# Patient Record
Sex: MALE | Race: BLACK OR AFRICAN AMERICAN
[De-identification: names, ages, dates, MRNs, and addresses within clinical notes are randomized per-mention and may not be internally consistent; named-entity substitution may affect disease eponyms.]

---

## 2018-03-08 ENCOUNTER — HOSPITAL ENCOUNTER (EMERGENCY)
Dept: HOSPITAL 72 - EMR | Age: 63
Discharge: ANOTHER HOSPITAL | End: 2018-03-08
Payer: COMMERCIAL

## 2018-03-08 VITALS — DIASTOLIC BLOOD PRESSURE: 108 MMHG | SYSTOLIC BLOOD PRESSURE: 169 MMHG

## 2018-03-08 VITALS — DIASTOLIC BLOOD PRESSURE: 129 MMHG | SYSTOLIC BLOOD PRESSURE: 152 MMHG

## 2018-03-08 VITALS — HEIGHT: 73 IN | WEIGHT: 185 LBS | BODY MASS INDEX: 24.52 KG/M2

## 2018-03-08 DIAGNOSIS — I24.9: Primary | ICD-10-CM

## 2018-03-08 DIAGNOSIS — I10: ICD-10-CM

## 2018-03-08 LAB
ADD MANUAL DIFF: NO
ALBUMIN SERPL-MCNC: 3.4 G/DL (ref 3.4–5)
ALBUMIN/GLOB SERPL: 0.9 {RATIO} (ref 1–2.7)
ALP SERPL-CCNC: 48 U/L (ref 46–116)
ALT SERPL-CCNC: 50 U/L (ref 12–78)
ANION GAP SERPL CALC-SCNC: 4 MMOL/L (ref 5–15)
APPEARANCE UR: CLEAR
APTT PPP: (no result) S
AST SERPL-CCNC: 24 U/L (ref 15–37)
BASOPHILS NFR BLD AUTO: 1.2 % (ref 0–2)
BILIRUB SERPL-MCNC: 0.3 MG/DL (ref 0.2–1)
BUN SERPL-MCNC: 13 MG/DL (ref 7–18)
CALCIUM SERPL-MCNC: 9 MG/DL (ref 8.5–10.1)
CHLORIDE SERPL-SCNC: 106 MMOL/L (ref 98–107)
CO2 SERPL-SCNC: 30 MMOL/L (ref 21–32)
CREAT SERPL-MCNC: 1.5 MG/DL (ref 0.55–1.3)
EOSINOPHIL NFR BLD AUTO: 4.3 % (ref 0–3)
ERYTHROCYTE [DISTWIDTH] IN BLOOD BY AUTOMATED COUNT: 10.6 % (ref 11.6–14.8)
GLOBULIN SER-MCNC: 4 G/DL
GLUCOSE UR STRIP-MCNC: NEGATIVE MG/DL
HCT VFR BLD CALC: 42.8 % (ref 42–52)
HGB BLD-MCNC: 15.2 G/DL (ref 14.2–18)
KETONES UR QL STRIP: NEGATIVE
LEUKOCYTE ESTERASE UR QL STRIP: NEGATIVE
LYMPHOCYTES NFR BLD AUTO: 17.1 % (ref 20–45)
MCV RBC AUTO: 94 FL (ref 80–99)
MONOCYTES NFR BLD AUTO: 6.3 % (ref 1–10)
NEUTROPHILS NFR BLD AUTO: 71.1 % (ref 45–75)
NITRITE UR QL STRIP: NEGATIVE
PH UR STRIP: 7 [PH] (ref 4.5–8)
PLATELET # BLD: 280 K/UL (ref 150–450)
POTASSIUM SERPL-SCNC: 3.8 MMOL/L (ref 3.5–5.1)
PROT UR QL STRIP: NEGATIVE
RBC # BLD AUTO: 4.53 M/UL (ref 4.7–6.1)
SODIUM SERPL-SCNC: 140 MMOL/L (ref 136–145)
SP GR UR STRIP: 1.01 (ref 1–1.03)
UROBILINOGEN UR-MCNC: NORMAL MG/DL (ref 0–1)
WBC # BLD AUTO: 7.3 K/UL (ref 4.8–10.8)

## 2018-03-08 PROCEDURE — 36415 COLL VENOUS BLD VENIPUNCTURE: CPT

## 2018-03-08 PROCEDURE — 80307 DRUG TEST PRSMV CHEM ANLYZR: CPT

## 2018-03-08 PROCEDURE — 93005 ELECTROCARDIOGRAM TRACING: CPT

## 2018-03-08 PROCEDURE — 83880 ASSAY OF NATRIURETIC PEPTIDE: CPT

## 2018-03-08 PROCEDURE — 85025 COMPLETE CBC W/AUTO DIFF WBC: CPT

## 2018-03-08 PROCEDURE — 71045 X-RAY EXAM CHEST 1 VIEW: CPT

## 2018-03-08 PROCEDURE — 81003 URINALYSIS AUTO W/O SCOPE: CPT

## 2018-03-08 PROCEDURE — 99284 EMERGENCY DEPT VISIT MOD MDM: CPT

## 2018-03-08 PROCEDURE — 84484 ASSAY OF TROPONIN QUANT: CPT

## 2018-03-08 PROCEDURE — 80053 COMPREHEN METABOLIC PANEL: CPT

## 2018-03-08 RX ADMIN — NITROGLYCERIN PRN MG: 0.4 TABLET SUBLINGUAL at 09:58

## 2018-03-08 RX ADMIN — NITROGLYCERIN PRN MG: 0.4 TABLET SUBLINGUAL at 09:49

## 2018-03-08 NOTE — DIAGNOSTIC IMAGING REPORT
Indication: Chest pain

 

Comparison:  11/5/2014

 

A single view chest radiograph was obtained.

 

Findings:

 

Cardiomediastinal appearance is within normal limits for age. Aorta is ectatic.

Pulmonary vascularity is appropriate. The diaphragmatic contour is smooth and

costophrenic angles are sharp. No pleural effusions are identified. The bones are

slightly osteopenic.

 

Impression: No acute findings

## 2018-03-08 NOTE — EMERGENCY ROOM REPORT
History of Present Illness


General


Chief Complaint:  Chest Pain


Source:  Patient





Present Illness


HPI


62-year-old male with pmhx of HTN, occasional smoking history p/w chest pain 

for one hour. Chest pain started while at rest.  His friend took his blood 

pressure and noted it to be high systolic 180.  Chest pain is localized to 

substernal area, no radiation to back or other areas, sharp in nature, gradual 

in onset, pain has gradually improved since coming to the emergency room. no 

SOB. Denies palpitations, diaphoresis, n/v. 


Denies fever, chills, cough, abd pain. Denies trauma.


Patient states that he had a cardiac workup, including stress test and angiogram

, that was performed more than 6-7 years ago, which were normal


States that he smokes occasionally, never one pack of cigarettes per day.  

Denies other drug use


Allergies:  


Coded Allergies:  


     NO KNOWN DRUG ALLERGIES (Unverified  Allergy, Unknown, 11/6/14)





Patient History


Past Medical History:  see triage record


Past Surgical History:  none


Pertinent Family History:  none


Reviewed Nursing Documentation:  PMH: Agreed, PSxH: Agreed





Nursing Documentation-PMH


Past Medical History:  No History, Except For


Hx Hypertension:  Yes





Review of Systems


All Other Systems:  negative except mentioned in HPI





Physical Exam





Vital Signs








  Date Time  Temp Pulse Resp B/P (MAP) Pulse Ox O2 Delivery O2 Flow Rate FiO2


 


3/8/18 09:22 98.4 85 16 182/120 96 Room Air  





 98.4       








Sp02 EP Interpretation:  reviewed, normal


General Appearance:  normal inspection, well appearing, no apparent distress, 

alert, GCS 15, non-toxic


Head:  normocephalic, atraumatic


Eyes:  bilateral eye normal inspection, bilateral eye PERRL, bilateral eye EOMI


ENT:  normal ENT inspection, normal pharynx, normal voice, moist mucus membranes


Neck:  normal inspection, full range of motion, supple


Respiratory:  normal inspection, lungs clear, normal breath sounds, no 

respiratory distress, no retraction, no wheezing, speaking full sentences, 

chest symmetrical


Cardiovascular #1:  normal inspection, regular rate, rhythm, no edema, normal 

capillary refill


Cardiovascular #2:  2+ radial (R), 2+ radial (L)


Gastrointestinal:  normal inspection, non tender, soft, non-distended, no 

guarding


Genitourinary:  no CVA tenderness


Musculoskeletal:  normal inspection, back normal, normal range of motion, non-

tender


Neurologic:  normal inspection, alert, oriented x3, responsive, motor strength/

tone normal, sensory intact, normal gait, speech normal


Psychiatric:  normal inspection, judgement/insight normal, memory normal


Skin:  normal inspection, normal color, no rash, warm/dry, well hydrated, 

normal turgor





Medical Decision Making


Diagnostic Impression:  


 Primary Impression:  


 ACS (acute coronary syndrome)


ER Course


62-year-old male with pmhx of hypertension p/w CP


DDX: ACS vs. CHF vs. pneumonia vs. gastritis/GERD vs. pneumothorax 


PE on differential however at this time there are other more likely diagnoses. 





Plan:


IV access, obtain labs including troponin, EKG, CXR


ASA, pain control with nitro / morphine


Anticipate admission





ER course:


Patient was treated with ASA.


Patient has remained on a monitor, HD stable, chest pain improved.





Disposition:


Patient requires admission for chest pain due to new TWI on EKG and some 

persistent CP


Due to patient's history and comorbidities, patient has increased risk of acute 

cardiac event. Patient requires admission for further workup, serial troponin, 

and possible stress test





Pt will be xferred due to insurance purposes -- [facility] hospitalist Dr King who 

has accepted pt to xfer to [facility]





Please note that this Emergency Department Report was dictated using [employer] technology software, occasionally this can lead to 

erroneous entry secondary to interpretation by the dictation equipment.





EKG Diagnostic Results


EP Interpretation: Yes


Rate: normal


Rhythm: NSR


ST Segments: T-wave inversion lateral leads that are new since previous EKG 2014


ASA given to patient: Yes





Rhythm Strip


EP Interpretation: Yes


Rate: 70


Rhythm: NSR, no PVCs, no ectopy





Chest X-ray


CXR: Ordered: Yes


1 view


Indication: Chest pain


EP interpretation: Yes


Interpretation: No consolidation, no effusion, no PTX, no acute cardiopulmonary 

disease


Impression: No acute disease





Electronically signed by Willa Arguello MD





Laboratory Tests








Test


  3/8/18


09:30 3/8/18


10:36


 


White Blood Count


  7.3 K/UL


(4.8-10.8) 


 


 


Red Blood Count


  4.53 M/UL


(4.70-6.10)  L 


 


 


Hemoglobin


  15.2 G/DL


(14.2-18.0) 


 


 


Hematocrit


  42.8 %


(42.0-52.0) 


 


 


Mean Corpuscular Volume 94 FL (80-99)   


 


Mean Corpuscular Hemoglobin


  33.5 PG


(27.0-31.0)  H 


 


 


Mean Corpuscular Hemoglobin


Concent 35.4 G/DL


(32.0-36.0) 


 


 


Red Cell Distribution Width


  10.6 %


(11.6-14.8)  L 


 


 


Platelet Count


  280 K/UL


(150-450) 


 


 


Mean Platelet Volume


  6.0 FL


(6.5-10.1)  L 


 


 


Neutrophils (%) (Auto)


  71.1 %


(45.0-75.0) 


 


 


Lymphocytes (%) (Auto)


  17.1 %


(20.0-45.0)  L 


 


 


Monocytes (%) (Auto)


  6.3 %


(1.0-10.0) 


 


 


Eosinophils (%) (Auto)


  4.3 %


(0.0-3.0)  H 


 


 


Basophils (%) (Auto)


  1.2 %


(0.0-2.0) 


 


 


Sodium Level


  140 MMOL/L


(136-145) 


 


 


Potassium Level


  3.8 MMOL/L


(3.5-5.1) 


 


 


Chloride Level


  106 MMOL/L


() 


 


 


Carbon Dioxide Level


  30 MMOL/L


(21-32) 


 


 


Anion Gap


  4 mmol/L


(5-15)  L 


 


 


Blood Urea Nitrogen


  13 mg/dL


(7-18) 


 


 


Creatinine


  1.5 MG/DL


(0.55-1.30)  H 


 


 


Estimate Glomerular


Filtration Rate 57.7 mL/min


(>60) 


 


 


Glucose Level


  135 MG/DL


()  H 


 


 


Calcium Level


  9.0 MG/DL


(8.5-10.1) 


 


 


Total Bilirubin


  0.3 MG/DL


(0.2-1.0) 


 


 


Aspartate Amino Transferase


(AST) 24 U/L (15-37)


  


 


 


Alanine Aminotransferase (ALT)


  50 U/L (12-78)


  


 


 


Alkaline Phosphatase


  48 U/L


() 


 


 


Troponin I


  0.005 ng/mL


(0.000-0.056) 


 


 


Pro-B-Type Natriuretic Peptide


  117 pg/mL


(0-125) 


 


 


Total Protein


  7.4 G/DL


(6.4-8.2) 


 


 


Albumin


  3.4 G/DL


(3.4-5.0) 


 


 


Globulin 4.0 g/dL   


 


Albumin/Globulin Ratio


  0.9 (1.0-2.7)


L 


 


 


Urine Color  Pending  


 


Urine Appearance  Pending  


 


Urine pH  Pending  


 


Urine Specific Gravity  Pending  


 


Urine Protein  Pending  


 


Urine Glucose (UA)  Pending  


 


Urine Ketones  Pending  


 


Urine Occult Blood  Pending  


 


Urine Nitrite  Pending  


 


Urine Bilirubin  Pending  


 


Urine Urobilinogen  Pending  


 


Urine Leukocyte Esterase  Pending  


 


Urine Opiates Screen  Pending  


 


Urine Barbiturates Screen  Pending  


 


Phencyclidine (PCP) Screen  Pending  


 


Urine Amphetamines Screen  Pending  


 


Urine Benzodiazepines Screen  Pending  


 


Urine Cocaine Screen  Pending  


 


Urine Marijuana (THC) Screen  Pending  











Last Vital Signs








  Date Time  Temp Pulse Resp B/P (MAP) Pulse Ox O2 Delivery O2 Flow Rate FiO2


 


3/8/18 09:35 97.4 82 18 152/129 99 Room Air  





 97.4       








Disposition:  XFER SHT-TRM HOSP


Condition:  Serious


Referrals:  


[facility],REFERRING (PCP)











Willa Arguello M.D. Mar 8, 2018 09:44

## 2018-03-11 NOTE — CARDIOLOGY REPORT
--------------- APPROVED REPORT --------------





EKG Measurement

Heart [id number]

MI 

[id number]

[id number]

[id number]

[id number]





Normal sinus rhythm with sinus arrhythmia

Left axis deviation



Abnormal ECG

## 2020-07-21 ENCOUNTER — HOSPITAL ENCOUNTER (EMERGENCY)
Dept: HOSPITAL 72 - EMR | Age: 65
Discharge: ANOTHER HOSPITAL | End: 2020-07-21
Payer: COMMERCIAL

## 2020-07-21 VITALS — SYSTOLIC BLOOD PRESSURE: 152 MMHG | DIASTOLIC BLOOD PRESSURE: 85 MMHG

## 2020-07-21 VITALS — DIASTOLIC BLOOD PRESSURE: 101 MMHG | SYSTOLIC BLOOD PRESSURE: 157 MMHG

## 2020-07-21 VITALS — WEIGHT: 185 LBS | HEIGHT: 72 IN | BODY MASS INDEX: 25.06 KG/M2

## 2020-07-21 VITALS — DIASTOLIC BLOOD PRESSURE: 93 MMHG | SYSTOLIC BLOOD PRESSURE: 148 MMHG

## 2020-07-21 DIAGNOSIS — I10: ICD-10-CM

## 2020-07-21 DIAGNOSIS — R20.0: ICD-10-CM

## 2020-07-21 DIAGNOSIS — F14.10: ICD-10-CM

## 2020-07-21 DIAGNOSIS — G45.9: Primary | ICD-10-CM

## 2020-07-21 DIAGNOSIS — N17.9: ICD-10-CM

## 2020-07-21 DIAGNOSIS — F12.10: ICD-10-CM

## 2020-07-21 DIAGNOSIS — E78.5: ICD-10-CM

## 2020-07-21 DIAGNOSIS — R00.1: ICD-10-CM

## 2020-07-21 DIAGNOSIS — F17.200: ICD-10-CM

## 2020-07-21 DIAGNOSIS — F12.90: ICD-10-CM

## 2020-07-21 LAB
ADD MANUAL DIFF: NO
ALBUMIN SERPL-MCNC: 4.2 G/DL (ref 3.4–5)
ALBUMIN/GLOB SERPL: 1 {RATIO} (ref 1–2.7)
ALP SERPL-CCNC: 66 U/L (ref 46–116)
ALT SERPL-CCNC: 70 U/L (ref 12–78)
ANION GAP SERPL CALC-SCNC: 5 MMOL/L (ref 5–15)
AST SERPL-CCNC: 21 U/L (ref 15–37)
BASOPHILS NFR BLD AUTO: 1 % (ref 0–2)
BILIRUB SERPL-MCNC: 0.2 MG/DL (ref 0.2–1)
BUN SERPL-MCNC: 20 MG/DL (ref 7–18)
CALCIUM SERPL-MCNC: 10 MG/DL (ref 8.5–10.1)
CALCIUM SERPL-MCNC: 10.1 MG/DL (ref 8.5–10.1)
CHLORIDE SERPL-SCNC: 99 MMOL/L (ref 98–107)
CHOLEST SERPL-MCNC: 143 MG/DL (ref ?–200)
CO2 SERPL-SCNC: 34 MMOL/L (ref 21–32)
CREAT SERPL-MCNC: 1.5 MG/DL (ref 0.55–1.3)
EOSINOPHIL NFR BLD AUTO: 0.9 % (ref 0–3)
ERYTHROCYTE [DISTWIDTH] IN BLOOD BY AUTOMATED COUNT: 10.9 % (ref 11.6–14.8)
GLOBULIN SER-MCNC: 4 G/DL
HCT VFR BLD CALC: 47.6 % (ref 42–52)
HDLC SERPL-MCNC: 72 MG/DL (ref 40–60)
HGB BLD-MCNC: 16.3 G/DL (ref 14.2–18)
LYMPHOCYTES NFR BLD AUTO: 15.5 % (ref 20–45)
MCV RBC AUTO: 94 FL (ref 80–99)
MONOCYTES NFR BLD AUTO: 7.6 % (ref 1–10)
NEUTROPHILS NFR BLD AUTO: 75 % (ref 45–75)
PLATELET # BLD: 332 K/UL (ref 150–450)
POTASSIUM SERPL-SCNC: 3.9 MMOL/L (ref 3.5–5.1)
RBC # BLD AUTO: 5.06 M/UL (ref 4.7–6.1)
SODIUM SERPL-SCNC: 138 MMOL/L (ref 136–145)
TRIGL SERPL-MCNC: 79 MG/DL (ref 30–150)
WBC # BLD AUTO: 14.9 K/UL (ref 4.8–10.8)

## 2020-07-21 PROCEDURE — 80307 DRUG TEST PRSMV CHEM ANLYZR: CPT

## 2020-07-21 PROCEDURE — 99291 CRITICAL CARE FIRST HOUR: CPT

## 2020-07-21 PROCEDURE — 93005 ELECTROCARDIOGRAM TRACING: CPT

## 2020-07-21 PROCEDURE — 80061 LIPID PANEL: CPT

## 2020-07-21 PROCEDURE — 70450 CT HEAD/BRAIN W/O DYE: CPT

## 2020-07-21 PROCEDURE — 96374 THER/PROPH/DIAG INJ IV PUSH: CPT

## 2020-07-21 PROCEDURE — 84484 ASSAY OF TROPONIN QUANT: CPT

## 2020-07-21 PROCEDURE — 82310 ASSAY OF CALCIUM: CPT

## 2020-07-21 PROCEDURE — 83690 ASSAY OF LIPASE: CPT

## 2020-07-21 PROCEDURE — 83735 ASSAY OF MAGNESIUM: CPT

## 2020-07-21 PROCEDURE — 83880 ASSAY OF NATRIURETIC PEPTIDE: CPT

## 2020-07-21 PROCEDURE — 36415 COLL VENOUS BLD VENIPUNCTURE: CPT

## 2020-07-21 PROCEDURE — 71045 X-RAY EXAM CHEST 1 VIEW: CPT

## 2020-07-21 PROCEDURE — 85025 COMPLETE CBC W/AUTO DIFF WBC: CPT

## 2020-07-21 PROCEDURE — 80053 COMPREHEN METABOLIC PANEL: CPT

## 2020-07-21 NOTE — DIAGNOSTIC IMAGING REPORT
Indication: Shortness of breath

 

Technique: One view of the chest

 

Comparison: 3/8/2018

 

Findings: Lungs and pleural spaces are clear. Heart size is normal. No significant

change

 

Impression: No acute process

## 2020-07-21 NOTE — EMERGENCY ROOM REPORT
History of Present Illness


General


Chief Complaint:  General Complaint


Source:  Patient





Present Illness


HPI


History of present illness:


61-year-old -American male with past medical history of hypertension 

presents with left upper extremity paresthesia X 2 days.





The patient's symptoms were abrupt onset, severity was moderate, duration since 

2 days.  


Symptoms happened in the context of when he woke up 2 days ago he found his 

left upper extremity slightly weaker but the weakness gradually went away.  The 

paresthesias remain..   The patient endorses associated symptoms of numbness/

tingling of the left arm, forearm, and hand, and  denies any associated 

symptoms of headache, vision changes, slurred speech, facial droop, or 

difficulty walking


Denies head trauma.  States he has been compliant with his medications








Past medical history: Hypertension, dyslipidemia


Past surgical history: Ortho


Smoking: Denies


Alcohol use: Occasional


Drug use: Occasional marijuana





Review of systems:


CONST: No fevers or chills, No night sweats


PULMONARY: No productive cough,  No shortness of breath 


CARDIAC: No chest pain, No palpitations 


GI: No vomiting, No diarrhea , No melena_or_BRBPR 


: No dysuria, No hematuria, No discharge 


NEURO: No new_focal_weakness_or_numbness, No confusion, No vision changes


14 point Review of Systems is otherwise negative except per HPI





Physical Exam:


GENERAL: Awake_alert_ nontoxic, no acute distress Spo2 96 % on room air, normal


EYES: Extraocular muscles are intact. Conjunctivae clear. Lids without swelling


ENT: External nose and ear normal_in_appearance. Oropharynx clear. Head_

atraumatic, Moist_oral_mucosa


NECK:  No JVD. No meningismus. No thyromegaly.  Supple. Trachea midline


RESP: Normal respiratory effort. Symmetric rise. No stridor. Clear_to_

auscultation_No_rales_No_wheezes


CARDIAC: Regular rate and regular rhythm on_auscultation   No_significant pedal 

edema.


ABDOMEN: Soft. Nondistended.  Nontender_No_rebound_or_guarding.


MSK:  Normal muscle tone, without rigidity.  Extremities without asymmetric 

deformity or swelling.  


SKIN: Warm and dry.  No visible cyanosis or pallor


NEUROLOGIC: Alert, oriented x3.  Motor_and_sensation_grossly_intact. No truncal 

ataxia. Gait_normal


Sensation to left upper extremity.  Bilateral upper and lower extremity 

strength plus 5 out of 5, symmetric bilaterally negative Romberg sign.


Abnormal left upper extremity finger to nose. Normal RUE finger to nose. No 

pronator drift. 


Psych: Normal mood and affect, normal judgment and insight











- COORDINATION OF CARE


Case was discussed with: Patient  





Any labs and imaging that were ordered were interpreted as part of the medical 

decision making:








Medical Decision Making/Plan:





Differential diagnosis includes TIA,  ischemic stroke, hemorrhagic stroke, morelia

s paralysis, hypoglycemia, brain mass, metabolic encephalopathy, among others.





The patients presentation appears most consistent with a diagnosis of TIA 

given transient neurologic symptoms that have completely resolved 





CT shows old R lacunar infarct. This is likely the cause for the patient's 

symptoms. No ICH. 


Chest x-ray shows no acute abnormalities.


Left shoulder repair labs show ALAN.  Troponin is negative x1.





The patient is not a tPa candidate because their NIH stroke scale is zero. They 

have a normal neurologic exam, all symptoms resolved and back to baseline 


The patient will be admitted to the hospital to a telemetry bed for further 

workup and treatment. 





Aspirin was given and care transitioned to the admitting physician.





1600: I spoke with Dr. Narayanan, and reviewed the patients presentation, workup, 

results, and treatment.  They will admit the patient for further care and 

evaluation, and assume care of the patient at this time.





The patient has been stabilized to the best of this emergency department's 

capabilities. Given the patient's medical needs, appropriate facilities for 

transfer were discussed and the decision has been made to transfer this patient 

to [facility] due to capitation. The receiving facility has the 

capacity and capabilities to provide care for the patient. I spoke with Dr Narayanan who accepted the patient in transfer. The patient has been informed and 

updated of their current clinical status. The patient has given verbal consent 

for the transfer. The risks and benefits were explained and the patient 

verbalizes their understanding. The patient will be transported by [facility]


Allergies:  


Coded Allergies:  


     NO KNOWN DRUG ALLERGIES (Unverified  Allergy, Unknown, 11/6/14)





COVID-19 Screening


Contact w/high risk pt:  No


Experienced COVID-19 symptoms?:  No


COVID-19 Testing performed PTA:  No





Nursing Documentation-PMH


Past Medical History:  No History, Except For


Hx Hypertension:  Yes





Physical Exam





Vital Signs








  Date Time  Temp Pulse Resp B/P (MAP) Pulse Ox O2 Delivery O2 Flow Rate FiO2


 


7/21/20 13:43 98.4 63 14 155/99 (117) 96 Room Air  








Sp02 EP Interpretation:  reviewed, normal





Procedures


Critical Care Time


Critical Care Time


Critical Care Statement


Organ systems at risk include: Neurologic, cardiac, circulatory


Critical care performed for 45 minutes. Time is exclusive of separately 

billable procedures. 


Time includes: direct patient care, continuous monitoring and multiple patient 

reassessment, coordination of patient care, review of patient's medical records

, medical consultation, family consultation regarding treatment decisions and 

documentation of patient care.





Medical Decision Making


Diagnostic Impression:  


 Primary Impression:  


 TIA (transient ischemic attack)


 Additional Impressions:  


 Paresthesia of left upper extremity


 Hypertension


 Hyperlipidemia


 Smoker


 Marijuana use


 Right-sided lacunar infarction


 ALAN (acute kidney injury)


 Cocaine abuse


 Marijuana abuse





EKG Diagnostic Results


EKG Time:  14:15


Other Impression


12-lead EKG (interpreted by me) 


Time: 1415


Indication: [Rhythm analysis]


Tracing visualized and Interpreted by me. Rhythm:  Sinus bradycardia 


Rate:  55 bpm


QTc:  401


Morphology: No_significant_ST_elevations_or_depressions, No STEMI


Impression: Sinus bradycardia, Twi V4-V6; nL axis


ASA given to the pt in ED:  Yes





Rhythm Strip Diag. Results


Rhythm Strip Time:  14:07


EP Interpretation:  yes


Rate:  63


Rhythm:  NSR, no PVC's, no ectopy


Other Impression


Normal





Chest X-Ray Diagnostic Results


Chest X-Ray Diagnostic Results :  


   PA Scribe Text


Chest X-ray: 


Views: 1 view(s)


Indication: TIA


Findings: Normal heart size.  Mediastinum normal.  No infiltrate.


Impression: No Acute disease


The X-ray(s) were independently viewed and interpreted contemporaneously - 

Electronically signed by Maday martinez DO





CT/MRI/US Diagnostic Results


CT/MRI/US Diagnostic Results :  


   Impression


Procedure: CT Head no Contrast


Indication: L Sided weakness


Comparison: None.


Findings: Old lacunar infarct is seen in the right frontal deep white matter. 

There


is mild periventricular deep white matter low-attenuation, consistent with 

chronic


microvascular ischemic change. The ventricles and extra-axial CSF spaces are 

normal


for age. No acute intracranial hemorrhage or edema. No mass effect nor midline 

shift.


The mastoids are clear. The visualized orbits and sinuses are unremarkable. The


calvarium is intact. 


 


Impression: Age-related changes, as described


Old right frontal lacunar infarct


Negative for acute intracranial bleed or mass effect


Reevaluation Time:  14:08





Last Vital Signs








  Date Time  Temp Pulse Resp B/P (MAP) Pulse Ox O2 Delivery O2 Flow Rate FiO2


 


7/21/20 13:43 98.4 63 14 155/99 (117) 96 Room Air  








Status:  improved


Disposition:  ADMITTED AS INPATIENT - [facility].  Accepting Dr Narayanan


Admanuj Decision Time:  14:08


Condition:  Stable











Maday Figueroa D.O. Jul 21, 2020 14:02

## 2020-07-21 NOTE — DIAGNOSTIC IMAGING REPORT
Indications: Left-sided weakness and numbness

 

Technique: Spiral acquisitions obtained through the brain. Angled axial and coronal 5

x 5 mm slices were reconstructed. Total dose length product 1072 mGycm.  CTDI vol(s)

53 mGy. Dose reduction achieved using automated exposure control

 

Comparison: None.

 

Findings: Old lacunar infarct is seen in the right frontal deep white matter. There

is mild periventricular deep white matter low-attenuation, consistent with chronic

microvascular ischemic change. The ventricles and extra-axial CSF spaces are normal

for age. No acute intracranial hemorrhage or edema. No mass effect nor midline shift.

The mastoids are clear. The visualized orbits and sinuses are unremarkable. The

calvarium is intact. 

 

Impression: Age-related changes, as described

 

Old right frontal lacunar infarct

 

Negative for acute intracranial bleed or mass effect

 

 

 

 

 

The CT scanner at [facility] is accredited by the [employer]

[employer]iology and the scans are performed using protocols designed to limit radiation

exposure to as low as reasonably achievable to attain images of sufficient resolution

adequate for diagnostic evaluation.